# Patient Record
Sex: MALE | URBAN - NONMETROPOLITAN AREA
[De-identification: names, ages, dates, MRNs, and addresses within clinical notes are randomized per-mention and may not be internally consistent; named-entity substitution may affect disease eponyms.]

---

## 2021-06-01 ENCOUNTER — APPOINTMENT (RX ONLY)
Dept: URBAN - NONMETROPOLITAN AREA CLINIC 4 | Facility: CLINIC | Age: 36
Setting detail: DERMATOLOGY
End: 2021-06-01

## 2021-06-01 DIAGNOSIS — L30.0 NUMMULAR DERMATITIS: ICD-10-CM

## 2021-06-01 PROCEDURE — ? COUNSELING

## 2021-06-01 PROCEDURE — ? PRESCRIPTION

## 2021-06-01 PROCEDURE — 99204 OFFICE O/P NEW MOD 45 MIN: CPT

## 2021-06-01 PROCEDURE — ? TREATMENT REGIMEN

## 2021-06-01 RX ORDER — CLOBETASOL PROPIONATE 0.5 MG/G
0.05% OINTMENT TOPICAL BID
Qty: 2 | Refills: 3 | Status: ERX | COMMUNITY
Start: 2021-06-01

## 2021-06-01 RX ADMIN — CLOBETASOL PROPIONATE 0.05%: 0.5 OINTMENT TOPICAL at 00:00

## 2021-06-01 ASSESSMENT — LOCATION SIMPLE DESCRIPTION DERM
LOCATION SIMPLE: LEFT PRETIBIAL REGION
LOCATION SIMPLE: RIGHT PRETIBIAL REGION

## 2021-06-01 ASSESSMENT — LOCATION DETAILED DESCRIPTION DERM
LOCATION DETAILED: RIGHT DISTAL PRETIBIAL REGION
LOCATION DETAILED: LEFT PROXIMAL PRETIBIAL REGION

## 2021-06-01 ASSESSMENT — LOCATION ZONE DERM: LOCATION ZONE: LEG

## 2021-06-01 NOTE — PROCEDURE: TREATMENT REGIMEN
Discontinue Regimen: Bactroban
Detail Level: Zone
Initiate Treatment: Clobetasol 0.05% topical ointment BID x 1 month

## 2021-06-01 NOTE — HPI: RASH
What Type Of Note Output Would You Prefer (Optional)?: Standard Output
Is The Patient Presenting As Previously Scheduled?: Yes
How Severe Is Your Rash?: mild
Is This A New Presentation, Or A Follow-Up?: Rash
Additional History: Patient states that his rash has gotten better. He saw his PCP, and was prescribed Bactroban, which is helping.